# Patient Record
Sex: FEMALE
[De-identification: names, ages, dates, MRNs, and addresses within clinical notes are randomized per-mention and may not be internally consistent; named-entity substitution may affect disease eponyms.]

---

## 2023-04-29 ENCOUNTER — NURSE TRIAGE (OUTPATIENT)
Dept: OTHER | Facility: CLINIC | Age: 68
End: 2023-04-29

## 2023-04-29 NOTE — TELEPHONE ENCOUNTER
Location of patient: [place]    Current Symptoms: Pt was discharged from the hospital yesterday for a bowel obstruction. She had surgery on Tuesday 4/25/2023. She has an incision beneath her belly button. Last night, the incision started to drain clear fluid. Associated Symptoms: NA    Pain Severity: Denies pain    Temperature: Denies fever    What has been tried: Covering incision with a bandaid     Recommended disposition: See PCP within 24 Hours    Care advice provided, patient verbalizes understanding; denies any other questions or concerns.     Outcome: Patient/caller agrees to proceed to nearest [facility]     This triage is a result of a call to the [address] [place] [address]    Reason for Disposition   [1] Clear or blood-tinged fluid draining from wound AND [2] no fever    Protocols used: Post-Op Incision Symptoms and Questions-ADULT-
patient